# Patient Record
(demographics unavailable — no encounter records)

---

## 2024-10-14 NOTE — ASSESSMENT
[FreeTextEntry1] : 1. Acne- chronic, improved but not at tx goal  - Treatment options previously discussed by other providers including topicals, doxy, accutane. Pt and mom opted for trial of Accutane  -Indication for Isotretinoin: acne vulgaris -Major side effects and risks reviewed in detail. Chief among these are teratogenesis, hepatic injury, dyslipidemia, and severe drying of the mucous membranes. Episodes of mood disturbances have been previously reported, including suicidal ideation and attempts in rare cases. The patient will report any such changes in mood, depressive symptoms or suicidal thoughts, headaches, joint or bone pains.  - Weight: 68 kg - Cumulative dose: 9,000 mg - Goal dose: at least 8,160-10,200 (120-150 mg/kg), though may require higher dosage - Patient notes multiple missed doses. Has ~ 25 day supply of isotretinoin at home - Advised pt to finish 6th sleeve of isotretinoin + 2 additional weeks of 30 mg BID, then STOP   2. High risk medication use - Hepatic profile and TGs on 08/14 reviewed, stable   - Reviewed CBC, CMP 9/2024, WNL   RTC 1 month

## 2024-10-14 NOTE — PHYSICAL EXAM
[FreeTextEntry3] : face clear, back with + scarring, few comedones on the back  mild erythema on central face  xerosis of the lips

## 2024-10-14 NOTE — HISTORY OF PRESENT ILLNESS
[FreeTextEntry1] : f/u acne [de-identified] : 17-year-old M presenting for f/u acne:   Tolerating isotretinoin well. Experiencing lip and eye dryness. Missed several doses this past month as pt was experiencing severe nausea/vomiting 2/2 GERD. Has about 6 sleeves of medication at home left, currently taking pills from 6th sleeve   Denies joint pain, muscle aches, abdominal pain, bloody diarrhea, mood changes, and headaches.   Previous hx: Acne- primarily on face and trunk Present for 4 years Significant dryness on his lips, some painful cracking. Applying Aquaphor and Vaseline multiple times a day.  Denies joint pain, abdominal pain, bloody diarrhea, mood changes, and headaches.  Potentially some signs of depression in the past per mom, which patient denied. Both agree he has not been exhibiting any signs or symptoms of depression recently. Never with any suicidal ideation.  No personal or family history of IBD

## 2024-11-05 NOTE — REASON FOR VISIT
[Subsequent Evaluation] : a subsequent evaluation for [Tinnitus] : tinnitus [Parent] : parent [Other: _____] : [unfilled] [FreeTextEntry2] : dizziness

## 2024-11-05 NOTE — REVIEW OF SYSTEMS
[Hearing Loss] : hearing loss [Dizziness] : dizziness [Vertigo] : vertigo [Ear Noises] : ear noises [Lightheadedness] : lightheadedness [Negative] : Heme/Lymph [Ear Pain] : no ear pain [Ear Itch] : no ear itch [Recurrent Ear Infections] : no recurrent ear infections [Ear Drainage] : no ear drainage [de-identified] : fatigue/ nauseous/ balance issues/ get irritated with loud noises

## 2024-11-05 NOTE — HISTORY OF PRESENT ILLNESS
[de-identified] : c/o problem wtih feeling dizzy for past 2 weeks.  Occ has problems with balance - certain visual and audio things set patient off.  Occ problems with turning head.  Unsteady wtih moving.   Does have headaches.  Occ issues with bright lights.  Does have tinnitus regularly.

## 2024-11-05 NOTE — ASSESSMENT
[FreeTextEntry1] : Patient with intermittent episodes of vertigo - possible bppv - also hx of sensitivity to light and sounds.   Exam unremarkable - audio and tymp normal - feel possible vestbular migraine - will get vng - if normal would recommend neuro eval - follow up after vng.

## 2024-11-15 NOTE — PHYSICAL EXAM
[Well-appearing] : well-appearing [Normocephalic] : normocephalic [No dysmorphic facial features] : no dysmorphic facial features [No ocular abnormalities] : no ocular abnormalities [Neck supple] : neck supple [No abnormal neurocutaneous stigmata or skin lesions] : no abnormal neurocutaneous stigmata or skin lesions [Straight] : straight [No deformities] : no deformities [Alert] : alert [Well related, good eye contact] : well related, good eye contact [Conversant] : conversant [Normal speech and language] : normal speech and language [Follows instructions well] : follows instructions well [VFF] : VFF [Pupils reactive to light and accommodation] : pupils reactive to light and accommodation [Full extraocular movements] : full extraocular movements [No papilledema] : no papilledema [No facial asymmetry or weakness] : no facial asymmetry or weakness [Gross hearing intact] : gross hearing intact [Equal palate elevation] : equal palate elevation [Good shoulder shrug] : good shoulder shrug [Normal tongue movement] : normal tongue movement [Midline tongue, no fasciculations] : midline tongue, no fasciculations [Normal axial and appendicular muscle tone] : normal axial and appendicular muscle tone [Gets up on table without difficulty] : gets up on table without difficulty [Normal finger tapping and fine finger movements] : normal finger tapping and fine finger movements [No abnormal involuntary movements] : no abnormal involuntary movements [5/5 strength in proximal and distal muscles of arms and legs] : 5/5 strength in proximal and distal muscles of arms and legs [Walks and runs well] : walks and runs well [Able to walk on heels] : able to walk on heels [Able to walk on toes] : able to walk on toes [2+ biceps] : 2+ biceps [Triceps] : triceps [Ankle jerks] : ankle jerks [No ankle clonus] : no ankle clonus [Bilaterally] : bilaterally [Localizes LT and temperature] : localizes LT and temperature [No dysmetria on FTNT] : no dysmetria on FTNT [Good walking balance] : good walking balance [Normal gait] : normal gait [Able to tandem well] : able to tandem well [Negative Romberg] : negative Romberg [de-identified] : Endorses diminished sensation to light touch V1-V3 on left side. Head impulse test abnormal, no nystagmus and no skew deviation.  [de-identified] : 2+ Right patellar reflex, 3+ left patellar reflex

## 2024-11-15 NOTE — REASON FOR VISIT
[Initial Consultation] : an initial consultation for [Headache] : headache [Patient] : patient [Mother] : mother [FreeTextEntry2] : Dizziness

## 2024-11-15 NOTE — PHYSICAL EXAM
[Well-appearing] : well-appearing [Normocephalic] : normocephalic [No dysmorphic facial features] : no dysmorphic facial features [No ocular abnormalities] : no ocular abnormalities [Neck supple] : neck supple [No abnormal neurocutaneous stigmata or skin lesions] : no abnormal neurocutaneous stigmata or skin lesions [Straight] : straight [No deformities] : no deformities [Alert] : alert [Well related, good eye contact] : well related, good eye contact [Conversant] : conversant [Normal speech and language] : normal speech and language [Follows instructions well] : follows instructions well [VFF] : VFF [Pupils reactive to light and accommodation] : pupils reactive to light and accommodation [Full extraocular movements] : full extraocular movements [No papilledema] : no papilledema [No facial asymmetry or weakness] : no facial asymmetry or weakness [Gross hearing intact] : gross hearing intact [Equal palate elevation] : equal palate elevation [Good shoulder shrug] : good shoulder shrug [Normal tongue movement] : normal tongue movement [Midline tongue, no fasciculations] : midline tongue, no fasciculations [Normal axial and appendicular muscle tone] : normal axial and appendicular muscle tone [Gets up on table without difficulty] : gets up on table without difficulty [Normal finger tapping and fine finger movements] : normal finger tapping and fine finger movements [No abnormal involuntary movements] : no abnormal involuntary movements [5/5 strength in proximal and distal muscles of arms and legs] : 5/5 strength in proximal and distal muscles of arms and legs [Walks and runs well] : walks and runs well [Able to walk on heels] : able to walk on heels [Able to walk on toes] : able to walk on toes [2+ biceps] : 2+ biceps [Triceps] : triceps [Ankle jerks] : ankle jerks [No ankle clonus] : no ankle clonus [Bilaterally] : bilaterally [Localizes LT and temperature] : localizes LT and temperature [No dysmetria on FTNT] : no dysmetria on FTNT [Good walking balance] : good walking balance [Normal gait] : normal gait [Able to tandem well] : able to tandem well [Negative Romberg] : negative Romberg [de-identified] : Endorses diminished sensation to light touch V1-V3 on left side. Head impulse test abnormal, no nystagmus and no skew deviation.  [de-identified] : 2+ Right patellar reflex, 3+ left patellar reflex

## 2024-11-15 NOTE — CONSULT LETTER
[Dear  ___] : Dear  [unfilled], [Consult Letter:] : I had the pleasure of evaluating your patient, [unfilled]. [Please see my note below.] : Please see my note below. [Consult Closing:] : Thank you very much for allowing me to participate in the care of this patient.  If you have any questions, please do not hesitate to contact me. [Sincerely,] : Sincerely, [FreeTextEntry3] : Dr. Uche Grant Caro, D.O. PGY-4 Child Neurology Resident  Dr. Prem Arredondo M.D. Child Neurology Attending

## 2024-11-15 NOTE — PLAN
[FreeTextEntry1] : 1. Vestibular migraine/peripheral vertigo, r/o IIH - Naproxen 500mg once daily no more than 3 times a week - Meclizine 12.5mg up to 3 times a day as needed - MRI head w/ and w/o contrast - Consider vestibular therapy - F/u in 1-2 months

## 2024-11-15 NOTE — END OF VISIT
[] : Resident [Time Spent: ___ minutes] : I have spent [unfilled] minutes of time on the encounter which excludes teaching and separately reported services. [FreeTextEntry2] : Time billed for excludes time spent on teaching.

## 2024-11-15 NOTE — ASSESSMENT
[FreeTextEntry1] : Tal is a 17 year old adolescent boy presenting for dizziness and headaches. Patient 's history and exam suggestive of either vestibular migraines, or a peripheral vertigo, however given recent prolonged use of Acutane idiopathic intracranial hypertension should be on the differential as well. Patient has previously had a normal MRI and exam today is less indicative of a central etiology of vertigo, but will repeat MRI given new complaints and that these episodes are significant enough to delay patient's return to school. Will recommend symptomatic treatment with Naproxen and Meclizine in an attempt to address both of these possible etiologies (peripheral vertigo and/or vestibular migraines). Discussed with mother that Tal may benefit from vestibular therapy if these episodes continue.

## 2024-11-15 NOTE — HISTORY OF PRESENT ILLNESS
[Previous Imaging] : yes [Blurry Vision] : blurry vision [Tinnitus] : tinnitus [Phonophobia] : phonophobia [Nausea] : nausea [Photophobia] : photophobia [Dizziness] : dizziness [Vomiting] : Vomiting [FreeTextEntry1] : Tal is a 17 year old adolescent boy with PMH of GERD presenting due to headache and dizziness.   Tal reports that starting about 2-3 weeks ago he began to have almost daily episodes of dizziness that he describes as a feeling of the room spinning around him, or him feeling very unbalanced and unable to walk straight. He states that this dizziness is usually triggered by loud sudden noises or sudden bright lights. Tal is normally able to play video games without difficulty but he reports lately it has been uncomfortable to due so. Tal also reports that the dizziness is oftentimes worse at night when laying flat in bed. Mother reports that she has often found that Tal will be unsteady especially after standing up from a seated position and at times while walking he will have to keep himself steady by holding onto the wall or something else for support.   Tal also states that he has had episodes of headaches that at times occur with the dizziness, These headaches may last several hours, at times Tal has gone to bed with them and woken up the next morning with them. He describes them as bitemporal with a pounding/throbbing sensation. Tal has tried Tylenol for these headaches with minimal relief. He also has slight blurry vision, intermittent hearing loss, tinnitus, nausea and vomiting with these headaches. At times, Tal reports his attention/concentration is affected by these headaches. Mother reports that Tal has not been to school for two months due to an incident of bullying--he has been receiving at home virtual instruction since then.   Patient recently seen by ENT, at that visit had a normal hearing test. ENT recommended Videonystagmography for further evaluation for BPPV. Tal has also been followed by GI for GERD and was recently started on Omeprazole and Cyproheptadine, however mother stopped these medications after headaches and dizziness started out of concern that these episodes were medication induced. Tal also follows with dermatology for acne and had been on Acutane for 8 months until recently.   Patient reports adequate amount of sleep, at least 8 hours a night. Appropriate hydration and PO intake endorsed as well. Had a GI illness two months ago but no other recent illnesses.  Patient was previously seen by Dr. Arredondo in June 2021 for a first time seizure like event--he had a MRI with and without contrast at that time which was normal and a normal ambulatory EEG.   PMH: GERD FH: uncle with migraines, no seizure history    [Head Trauma] : no head trauma [Infections] : no infections [Stressors] : no stressors [Paraesthesias] : no paraesthesias

## 2024-11-15 NOTE — REVIEW OF SYSTEMS
[Difficulty with Vision] : difficulty with vision [Normal] : Hematologic/Lymphatic [FreeTextEntry4] : see HPI. [FreeTextEntry8] : see HPI.

## 2024-11-20 NOTE — HISTORY OF PRESENT ILLNESS
[FreeTextEntry1] : RPV: f/u acne [de-identified] : Mr. SULMA SEGURA is a 17 year old M who presents for:   1. f/u Acne Vulgaris:  11/20/2024: Patient completed all remaining pills of Accutane as of several weeks ago. Endorses new onset Vertigo which began at end of October, preceeded by GI illness. Currently persistent, seeing Neuro, GI with pending MRI per Neuro. Continues to experience lip dryness, improving. Otherwise Denies joint pain, muscle aches, abdominal pain, bloody diarrhea, mood changes, and headaches.   Previous hx: Acne- primarily on face and trunk Present for 4 years Significant dryness on his lips, some painful cracking. Applying Aquaphor and Vaseline multiple times a day.  Denies joint pain, abdominal pain, bloody diarrhea, mood changes, and headaches.  Potentially some signs of depression in the past per mom, which patient denied. Both agree he has not been exhibiting any signs or symptoms of depression recently. Never with any suicidal ideation.  No personal or family history of IBD

## 2024-11-20 NOTE — HISTORY OF PRESENT ILLNESS
[FreeTextEntry1] : RPV: f/u acne [de-identified] : Mr. SULMA SEGURA is a 17 year old M who presents for:   1. f/u Acne Vulgaris:  11/20/2024: Patient completed all remaining pills of Accutane as of several weeks ago. Endorses new onset Vertigo which began at end of October, preceeded by GI illness. Currently persistent, seeing Neuro, GI with pending MRI per Neuro. Continues to experience lip dryness, improving. Otherwise Denies joint pain, muscle aches, abdominal pain, bloody diarrhea, mood changes, and headaches.   Previous hx: Acne- primarily on face and trunk Present for 4 years Significant dryness on his lips, some painful cracking. Applying Aquaphor and Vaseline multiple times a day.  Denies joint pain, abdominal pain, bloody diarrhea, mood changes, and headaches.  Potentially some signs of depression in the past per mom, which patient denied. Both agree he has not been exhibiting any signs or symptoms of depression recently. Never with any suicidal ideation.  No personal or family history of IBD

## 2024-11-20 NOTE — PHYSICAL EXAM
[FreeTextEntry3] : face clear, back with + scarring, few comedones on the back  mild erythema on central face  xerosis of the lips, significantly improved

## 2024-11-20 NOTE — ASSESSMENT
[FreeTextEntry1] : 1. Acne- chronic, stable, at treatment goal  with 2. Acne scarring - Patient now s/p Accutane, completed all remaining pills as of several weeks ago (per LV note, had ~ 25 day supply of isotretinoin) - Weight: 68 kg - Cumulative dose to date: 9,000 mg - Goal dose: at least 8,160-10,200 (120-150 mg/kg), though may require higher dosage - Plan to continue monitoring patient's acne while off of Accutane - Discussed option for cosmetic evaluation for further treatment of residual acne scarring when amenable  3. Vertigo - Of note, patient with vertigo first developed at end of October, patient with PMH of GERD and Tinnitus, vertigo first noted after GI infection, currently being worked up by GI, Neuro including pending MRI per Neuro; low likelihood of vertigo associated with Isotretinoin - CONTINUE following up with Neurology for further workup and management   Patient may follow up in 3 months for follow up of acne vulgaris if recurrent, or at later date for cosmetic evaluation, or sooner PRN if any changes, new or growing lesions

## 2024-12-17 NOTE — REVIEW OF SYSTEMS
[Fatigue] : fatigue [Oral Ulcer] : oral ulcer [Negative] : Skin [Immunizations are up to date] : Immunizations are up to date [Rash] : no rash [Eczema] : no eczema [de-identified] : acne, hx tinea versicolor

## 2024-12-17 NOTE — REVIEW OF SYSTEMS
[Fatigue] : fatigue [Oral Ulcer] : oral ulcer [Negative] : Skin [Immunizations are up to date] : Immunizations are up to date [Rash] : no rash [Eczema] : no eczema [de-identified] : acne, hx tinea versicolor

## 2024-12-17 NOTE — ASSESSMENT
[FreeTextEntry1] : 17-year-old male with history of constipation and reflux symptoms here for in reflux symptoms with heartburn and regurgitation as well as irregular stooling.  He had an upper endoscopy showing mild esophagitis however there has been no response to antacid med occasion or Periactin.  He does report the Periactin does improve his stooling pattern though baseline his stools are often loose but can also be hard and clog the toilet.  Symptoms are suggestive of functional disorder as he also has vertigo and dizziness and has not been able to attend school.  They are working on home instruction.  Recommend: -Change to Dexilant every morning -Stool testing -Would continue Periactin at night -Had a long discussion about how he could attend school.  He only needs to take 3 classes to graduate and he reports that the noise and Arun bothers him.  Gave him a note to use the bathroom as needed and suggested using noise canceling headphones or ear loops and asking that he be allowed to leave the classes 5 minutes early so he could change classes without crowds in the hallways at the guidance office would likely be able to help him with these requests -Follow-up with neurology and ENT - Family instructed to call  if any questions or concerns and I would like to see them back in about 2 to 3 months

## 2024-12-17 NOTE — HISTORY OF PRESENT ILLNESS
[de-identified] : This is a 17 year old patient who here for follow-up of abdominal pain, heartburn reflux and irregular stooling.  He had an upper endoscopy in October 2024 which was essentially normal.  There was a small area of the antrum that appeared to be raised suspicious for a pancreatic rest..  Histology showed scant eosinophils in the distal esophagus suggestive of esophagitis as well as lactose intolerance.  He was started on a PPI with no improvement in his symptoms followed by Periactin for gastric motility.  He then developed issues with dizziness and has been following with neurology as well as ENT for vertigo.  I reviewed the recent notes in the EMR from neurology  He is here for follow-up.  He has not been taking the periactin recently.  Did not help with the nausea or reflux but reports it helped him stool. Would have diarrhea and feel constipated. Stool was liquidy but variable sizes.   On the periactin he was able to stool more frequently and better. Currently stooling 1x per day, Stools can be any consistency,  Sometimes big and wide stools that gets stuck, stools are generally more loose than formed. Generally stools 1x per day but can be 3-4x per day, does not wake to stool.  Did have some difficulty providing specifics in his stooling pattern.  There is no blood or mucous in the stool.  Dizziness still the same.  Working with neurology and increasing topiramate, not better. Dizzy even when sitting. vision is fine.  Trouble describing how he feels with the vertigo.  Has trouble walking,  Yesterday he was bringing up foam, felt the reflux come up, foam has been coming up and get stuck in his throat.  No pain when it comes up.  The foam gets stuck and he can't spit it out.  A few times vomited foam but feels it come up, happens most days.  Reflux happens randomly, not related to eating, will randomly burp.  He forgets to take the PPI as it has not been helping.   Still feels burning.  He has gained weight

## 2024-12-17 NOTE — CONSULT LETTER
[Dear  ___] : Dear  [unfilled], [Courtesy Letter:] : I had the pleasure of seeing your patient, [unfilled], in my office today. [Please see my note below.] : Please see my note below. [Consult Closing:] : Thank you very much for allowing me to participate in the care of this patient.  If you have any questions, please do not hesitate to contact me. [Sincerely,] : Sincerely, [FreeTextEntry3] : Telma Marinelli MD\par  Attending Physician\par  Pediatric Gastroenterology and Nutrition

## 2024-12-17 NOTE — PHYSICAL EXAM
[Well Developed] : well developed [Well Nourished] : well nourished [NAD] : in no acute distress [PERRL] : pupils were equal, round, reactive to light  [Moist & Pink Mucous Membranes] : moist and pink mucous membranes [Normal Oropharynx] : the oropharynx was normal [CTAB] : lungs clear to auscultation bilaterally [Regular Rate and Rhythm] : regular rate and rhythm [Normal S1, S2] : normal S1 and S2 [Soft] : soft  [Normal Bowel Sounds] : normal bowel sounds [No HSM] : no hepatosplenomegaly appreciated [Rectal Exam Deferred] : rectal exam was deferred [Normal Tone] : normal tone [Well-Perfused] : well-perfused [Interactive] : interactive [Appropriate Affect] : appropriate affect [Appropriate Behavior] : appropriate behavior [icteric] : anicteric [Oral Ulcers] : no oral ulcers [Respiratory Distress] : no respiratory distress  [Wheeze] : no wheezing  [Murmur] : no murmur [Distended] : non distended [Tender] : tender  [Periumbilical] : in the periumbilical area [Stool Palpable] : no stool palpable [Mass ___ cm] : no masses were palpated [Lymphadenopathy] : no lymphadenopathy  [Edema] : no edema [Cyanosis] : no cyanosis [Rash] : no rash [Jaundice] : no jaundice

## 2024-12-17 NOTE — HISTORY OF PRESENT ILLNESS
[de-identified] : This is a 17 year old patient who here for follow-up of abdominal pain, heartburn reflux and irregular stooling.  He had an upper endoscopy in October 2024 which was essentially normal.  There was a small area of the antrum that appeared to be raised suspicious for a pancreatic rest..  Histology showed scant eosinophils in the distal esophagus suggestive of esophagitis as well as lactose intolerance.  He was started on a PPI with no improvement in his symptoms followed by Periactin for gastric motility.  He then developed issues with dizziness and has been following with neurology as well as ENT for vertigo.  I reviewed the recent notes in the EMR from neurology  He is here for follow-up.  He has not been taking the periactin recently.  Did not help with the nausea or reflux but reports it helped him stool. Would have diarrhea and feel constipated. Stool was liquidy but variable sizes.   On the periactin he was able to stool more frequently and better. Currently stooling 1x per day, Stools can be any consistency,  Sometimes big and wide stools that gets stuck, stools are generally more loose than formed. Generally stools 1x per day but can be 3-4x per day, does not wake to stool.  Did have some difficulty providing specifics in his stooling pattern.  There is no blood or mucous in the stool.  Dizziness still the same.  Working with neurology and increasing topiramate, not better. Dizzy even when sitting. vision is fine.  Trouble describing how he feels with the vertigo.  Has trouble walking,  Yesterday he was bringing up foam, felt the reflux come up, foam has been coming up and get stuck in his throat.  No pain when it comes up.  The foam gets stuck and he can't spit it out.  A few times vomited foam but feels it come up, happens most days.  Reflux happens randomly, not related to eating, will randomly burp.  He forgets to take the PPI as it has not been helping.   Still feels burning.  He has gained weight

## 2025-01-16 NOTE — PROCEDURE
[None] : None [FreeTextEntry1] : hearing loss [FreeTextEntry2] : same [FreeTextEntry3] : Operative microscope was used to examine the ear canal, ear drum and visible middle landmarks.  Adequate exam would not have been possible without the use of a microscope.  Findings are described.

## 2025-01-16 NOTE — REASON FOR VISIT
[Subsequent Evaluation] : a subsequent evaluation for [Mother] : mother [FreeTextEntry2] : vestibular migraines

## 2025-01-16 NOTE — HISTORY OF PRESENT ILLNESS
[de-identified] : 17 year old boy presents for vestibular migraines Last seen here in 2022 for hearing loss Referred by Dr Bustillo for vestibular migraines Follows with neurologist--saw them today, taking topamax-- going to wean off---then will start nortriptyline--recommended to start vestibular therapy Reports since September he has been having intermittent episodes of vertigo, sensitivity to light and sounds, motion sickness. Worse when changing positions and walking.  Continues to have tinnitus--using white noise.  Will sometimes lose hearing in left ear.  Intermittent bilateral otalgia.  VNG negative.  Does not wear hearing aids.  Denies otorrhea, fevers, and ear infections MRI [facility] 11/27/24

## 2025-01-16 NOTE — HISTORY OF PRESENT ILLNESS
[de-identified] : 17 year old boy presents for vestibular migraines Last seen here in 2022 for hearing loss Referred by Dr Bustillo for vestibular migraines Follows with neurologist--saw them today, taking topamax-- going to wean off---then will start nortriptyline--recommended to start vestibular therapy Reports since September he has been having intermittent episodes of vertigo, sensitivity to light and sounds, motion sickness. Worse when changing positions and walking.  Continues to have tinnitus--using white noise.  Will sometimes lose hearing in left ear.  Intermittent bilateral otalgia.  VNG negative.  Does not wear hearing aids.  Denies otorrhea, fevers, and ear infections MRI [facility] 11/27/24

## 2025-01-16 NOTE — CONSULT LETTER
[Dear  ___] : Dear  [unfilled], [Consult Letter:] : I had the pleasure of evaluating your patient, [unfilled]. [Please see my note below.] : Please see my note below. [Consult Closing:] : Thank you very much for allowing me to participate in the care of this patient.  If you have any questions, please do not hesitate to contact me. [Sincerely,] : Sincerely, [FreeTextEntry2] : Angelica Mares MD  [FreeTextEntry3] : Patience Raya MD, Otology Neurotology & Skull Base Surgery

## 2025-01-16 NOTE — PHYSICAL EXAM
[Clear to Auscultation] : lungs were clear to auscultation bilaterally [Normal Gait and Station] : normal gait and station [Normal muscle strength, symmetry and tone of facial, head and neck musculature] : normal muscle strength, symmetry and tone of facial, head and neck musculature [Normal] : no cervical lymphadenopathy [Exposed Vessel] : left anterior vessel not exposed [Wheezing] : no wheezing [Increased Work of Breathing] : no increased work of breathing with use of accessory muscles and retractions [de-identified] : +TTP at TMJs

## 2025-01-16 NOTE — PHYSICAL EXAM
[Clear to Auscultation] : lungs were clear to auscultation bilaterally [Normal Gait and Station] : normal gait and station [Normal muscle strength, symmetry and tone of facial, head and neck musculature] : normal muscle strength, symmetry and tone of facial, head and neck musculature [Normal] : no cervical lymphadenopathy [Exposed Vessel] : left anterior vessel not exposed [Wheezing] : no wheezing [Increased Work of Breathing] : no increased work of breathing with use of accessory muscles and retractions [de-identified] : +TTP at TMJs

## 2025-01-17 NOTE — HISTORY OF PRESENT ILLNESS
[FreeTextEntry1] : 17y M w/ no significant pmh presenting for follow up of suspected vestibular migraine.   Interval history: Patient continues to have daily episodes of dizziness (room spinning, feeling unsteady) sometimes associated w/ headcaches that are worsened w/ sudden changes in movement, bright lights, loud noises. He has tried 50mg BID of Topiramate for 3 weeks w/ no relief, tried rizatripan x1 w/ minimal relief. He has been unable to attend school and is awaiting home instruction. Has had an VNG which was wnl and audiology testing has been normal.

## 2025-01-17 NOTE — ASSESSMENT
[FreeTextEntry1] : 17y M w/ no pmh presenting for follow up evaluation of vestibular migraine. Patient w/ nonfocal exam. His symptoms continue to persist, and have some feature of inner ear dysfunction (vertiginous symptoms worsening w/ movement) as well as some features consistent w/ vestibular migraine (associated w/ headache, photo/phonophobia), given this suspect that etiology of patient's presentation is likely multifactorial. He at this point has no identified structural etiology for his symptoms Given lack of efficacy of topiramate will wean off and switch to Nortriptyline will escalation plan. Should continue to follow up ENT, and RTC in 6-8 weeks.

## 2025-01-17 NOTE — CONSULT LETTER
[FreeTextEntry3] : Radha Vuong MD PGY- 4 Child Neurology [facility]  Lynette Pimentel MD Child Neurology Attending Physician [facility] [address], [address] [place] [phone] [facility]

## 2025-01-17 NOTE — CONSULT LETTER
[FreeTextEntry3] : Radha Vuong MD PGY- 4 Child Neurology [facility]  Lynette Pimentel MD Child Neurology Attending Physician [facility] [address], [address] [place] [id number] [facility]

## 2025-01-17 NOTE — PLAN
[FreeTextEntry1] : - nortriptyline 10mg qD x1wk increase to 20mg qD - wean topiramate to 50mg qD x1wk then off - lifestyle modifications - migravent/migralief qD - vestibular therapy - RTC in 6-8 weeks.

## 2025-01-17 NOTE — QUALITY MEASURES
[Referral to behavioral health for frequent headaches discussed] : Referral to behavioral health for frequent headaches discussed: Not Applicable

## 2025-02-25 NOTE — PLAN
[FreeTextEntry1] : [ ] CBC, CMP, VitD, Ferritin, TFTs today [ ] Neuropsych referral provided [ ] Increase Nortriptyline to 30mg qHS [ ] RTC in 6 weeks or earlier if any concerns

## 2025-02-25 NOTE — PHYSICAL EXAM
[Well-appearing] : well-appearing [Normocephalic] : normocephalic [No dysmorphic facial features] : no dysmorphic facial features [No ocular abnormalities] : no ocular abnormalities [Neck supple] : neck supple [No deformities] : no deformities [Alert] : alert [Well related, good eye contact] : well related, good eye contact [Conversant] : conversant [Normal speech and language] : normal speech and language [Follows instructions well] : follows instructions well [VFF] : VFF [Pupils reactive to light and accommodation] : pupils reactive to light and accommodation [Full extraocular movements] : full extraocular movements [Saccadic and smooth pursuits intact] : saccadic and smooth pursuits intact [No nystagmus] : no nystagmus [No papilledema] : no papilledema [Normal facial sensation to light touch] : normal facial sensation to light touch [No facial asymmetry or weakness] : no facial asymmetry or weakness [Gross hearing intact] : gross hearing intact [Equal palate elevation] : equal palate elevation [Good shoulder shrug] : good shoulder shrug [Normal tongue movement] : normal tongue movement [Midline tongue, no fasciculations] : midline tongue, no fasciculations [R handed] : R handed [Normal axial and appendicular muscle tone] : normal axial and appendicular muscle tone [Gets up on table without difficulty] : gets up on table without difficulty [No pronator drift] : no pronator drift [Normal finger tapping and fine finger movements] : normal finger tapping and fine finger movements [No abnormal involuntary movements] : no abnormal involuntary movements [5/5 strength in proximal and distal muscles of arms and legs] : 5/5 strength in proximal and distal muscles of arms and legs [Walks and runs well] : walks and runs well [Able to do deep knee bend] : able to do deep knee bend [Able to walk on heels] : able to walk on heels [Able to walk on toes] : able to walk on toes [2+ biceps] : 2+ biceps [Triceps] : triceps [Knee jerks] : knee jerks [Ankle jerks] : ankle jerks [No ankle clonus] : no ankle clonus [Localizes LT and temperature] : localizes LT and temperature [No dysmetria on FTNT] : no dysmetria on FTNT [Good walking balance] : good walking balance [Normal gait] : normal gait [Able to tandem well] : able to tandem well [Negative Romberg] : negative Romberg [de-identified] : HINTS negative

## 2025-02-25 NOTE — PHYSICAL EXAM
[Well-appearing] : well-appearing [Normocephalic] : normocephalic [No dysmorphic facial features] : no dysmorphic facial features [No ocular abnormalities] : no ocular abnormalities [Neck supple] : neck supple [No deformities] : no deformities [Alert] : alert [Well related, good eye contact] : well related, good eye contact [Conversant] : conversant [Normal speech and language] : normal speech and language [Follows instructions well] : follows instructions well [VFF] : VFF [Pupils reactive to light and accommodation] : pupils reactive to light and accommodation [Full extraocular movements] : full extraocular movements [Saccadic and smooth pursuits intact] : saccadic and smooth pursuits intact [No nystagmus] : no nystagmus [No papilledema] : no papilledema [Normal facial sensation to light touch] : normal facial sensation to light touch [No facial asymmetry or weakness] : no facial asymmetry or weakness [Gross hearing intact] : gross hearing intact [Equal palate elevation] : equal palate elevation [Good shoulder shrug] : good shoulder shrug [Normal tongue movement] : normal tongue movement [Midline tongue, no fasciculations] : midline tongue, no fasciculations [R handed] : R handed [Normal axial and appendicular muscle tone] : normal axial and appendicular muscle tone [Gets up on table without difficulty] : gets up on table without difficulty [No pronator drift] : no pronator drift [Normal finger tapping and fine finger movements] : normal finger tapping and fine finger movements [No abnormal involuntary movements] : no abnormal involuntary movements [5/5 strength in proximal and distal muscles of arms and legs] : 5/5 strength in proximal and distal muscles of arms and legs [Walks and runs well] : walks and runs well [Able to do deep knee bend] : able to do deep knee bend [Able to walk on heels] : able to walk on heels [Able to walk on toes] : able to walk on toes [2+ biceps] : 2+ biceps [Triceps] : triceps [Knee jerks] : knee jerks [Ankle jerks] : ankle jerks [No ankle clonus] : no ankle clonus [Localizes LT and temperature] : localizes LT and temperature [No dysmetria on FTNT] : no dysmetria on FTNT [Good walking balance] : good walking balance [Normal gait] : normal gait [Able to tandem well] : able to tandem well [Negative Romberg] : negative Romberg [de-identified] : HINTS negative

## 2025-02-25 NOTE — ASSESSMENT
[FreeTextEntry1] : 18yo M w/ no PMH presenting for follow up evaluation of vestibular migraine. Neurologic exam today is intact and reassuring. No benefit noticed on Nortriptyline 20mg qD, however can take up to 6 weeks to see true effect and Tal has not been completely compliant. Will increase to 30mg qD and assess, if no improvement, will switch to Effexor. Regarding the new math difficulties, unclear if true cognitive deficits vs related to nausea and difficulty concentrating 2/2 vertigo, will refer for neuropsych testing.

## 2025-02-25 NOTE — HISTORY OF PRESENT ILLNESS
[FreeTextEntry1] : Tal is a 16yo M w/ no significant PMH presenting for follow up of suspected vestibular migraine. Last seen Jan 16, 2025.  Interval history: Weaned off of topiramate and uptitrated Nortriptyline to 20mg qD. Saw ENT Jan 16 who recommended biofeedback and vestibular rehab, also raised concern of cognitive decline in focus and attention at school. Mom has noticed some twitching of face/head worsened in setting of altered sleep schedule; melatonin supplement started. Today, pt and MOC report no improvement in headache frequency or severity nor improvement in vertigo since starting Nortryptiline, however Tal admits he hasn't taken it consistently this past week. He has tried the rizatriptan twice since it was prescribed and says it doesn't help much.  Of note, MOC and pt also report that Tal has had new difficulty performing in math. Typically does well in Calculus but has recently been struggling to help his 4th grade sister with her math homework.   Current medications: Nortriptyline 20mg qD Migrelief Rizatriptan PRN  History reviewed: At last visit, was continuing to complain of daily episodes of dizziness (room spinning, feeling unsteady) sometimes associated w/ headaches that are worsened w/ sudden changes in movement, bright lights, loud noises. At last visit, he was unable to attend school and is awaiting home instruction. Has had an VNG which was wnl and audiology testing has been normal.  Previous medications:  Topiramate 50mg BID (no relief) for 3 weeks w/ no relief  MRI brain w/wo (11/2024): normal

## 2025-02-25 NOTE — ASSESSMENT
[FreeTextEntry1] : 16yo M w/ no PMH presenting for follow up evaluation of vestibular migraine. Neurologic exam today is intact and reassuring. No benefit noticed on Nortriptyline 20mg qD, however can take up to 6 weeks to see true effect and Tal has not been completely compliant. Will increase to 30mg qD and assess, if no improvement, will switch to Effexor. Regarding the new math difficulties, unclear if true cognitive deficits vs related to nausea and difficulty concentrating 2/2 vertigo, will refer for neuropsych testing.

## 2025-02-25 NOTE — END OF VISIT
[] : Resident [Time Spent: ___ minutes] : I have spent [unfilled] minutes of time on the encounter which excludes teaching and separately reported services. [FreeTextEntry3] : Time spent excludes teaching Quality 130: Documentation Of Current Medications In The Medical Record: Current Medications with Name, Dosage, Frequency, or Route not Documented, Reason not Given Quality 111:Pneumonia Vaccination Status For Older Adults: Pneumococcal Vaccination Previously Received Quality 110: Preventive Care And Screening: Influenza Immunization: Influenza Immunization Administered during Influenza season Quality 431: Preventive Care And Screening: Unhealthy Alcohol Use - Screening: Patient screened for unhealthy alcohol use using a single question and scores less than 2 times per year Detail Level: Detailed Quality 226: Preventive Care And Screening: Tobacco Use: Screening And Cessation Intervention: Patient screened for tobacco use and is an ex/non-smoker

## 2025-03-06 NOTE — HISTORY OF PRESENT ILLNESS
[de-identified] : 17 year old boy presents for follow up evaluation of vestibular migraines. History of negative VNG. Follows with neurologist-- off the Topamax, now nortriptyline TID. Reports general sense of dizziness unchanged since last visit. Dizziness fluctuates. Triggered by light / loud sounds. Tinnitus is stable. Using a fan at night for white noise.  MRI Head 11/27/24

## 2025-03-06 NOTE — PHYSICAL EXAM
[Clear to Auscultation] : lungs were clear to auscultation bilaterally [Normal Gait and Station] : normal gait and station [Normal muscle strength, symmetry and tone of facial, head and neck musculature] : normal muscle strength, symmetry and tone of facial, head and neck musculature [Normal] : no cervical lymphadenopathy [Exposed Vessel] : left anterior vessel not exposed [Wheezing] : no wheezing [Increased Work of Breathing] : no increased work of breathing with use of accessory muscles and retractions [de-identified] : +TTP at TMJs

## 2025-03-11 NOTE — HISTORY OF PRESENT ILLNESS
[de-identified] : This is a 17 year old patient who here for follow-up of abdominal pain, heartburn reflux and irregular stooling.  He had an upper endoscopy in October 2024 which was essentially normal.  There was a small area of the antrum that appeared to be raised suspicious for a pancreatic rest..  Histology showed scant eosinophils in the distal esophagus suggestive of esophagitis as well as lactose intolerance.  He was started on a PPI with no improvement in his symptoms followed by Periactin for gastric motility.  He then developed issues with dizziness and has been following with neurology as well as ENT for vertigo.  I reviewed the recent notes in the EMR from neurology and ENT.   He is here for follow-up.  His vomiting improved.  No longer having spitting up.  He continues to have issues with dizziness.  He has been doing vestibular therapy.   Sometimes the food comes up, but not out. Still has some foam but not throwing up as much.  burping some.  His appetite has been less.  Eating less.  Has been doing some intermittent fasting because a website told him he was fat. He has been eating more healthy.   He is on dexilant, not sure if it helps, over time the nausea and vomiting has decreased, has leveled off.  He has not taken the Dexilant this week and does not feel any different.  Currently taking nortriptyline at night, he is not taking cyprohepatinge. OTC mag at night. Stools are 2x/d. Pierz 2/3 or 4. Recently hard to pass. No rectal pain with stooling. Added lena seeds. Going on more walks.  He is sometimes eating large amounts with intermittent fasting but says he is mostly not as hungry.  He has gained weight since last visit.

## 2025-03-11 NOTE — REVIEW OF SYSTEMS
[Fatigue] : fatigue [Oral Ulcer] : oral ulcer [Negative] : Skin [Immunizations are up to date] : Immunizations are up to date [Rash] : no rash [Eczema] : no eczema [de-identified] : acne, hx tinea versicolor

## 2025-03-11 NOTE — REASON FOR VISIT
[Consultation Follow Up] : a consultation follow up  [Patient] : patient [Mother] : mother [Father] : father

## 2025-03-11 NOTE — PHYSICAL EXAM
[Well Developed] : well developed [Well Nourished] : well nourished [NAD] : in no acute distress [PERRL] : pupils were equal, round, reactive to light  [Moist & Pink Mucous Membranes] : moist and pink mucous membranes [Normal Oropharynx] : the oropharynx was normal [CTAB] : lungs clear to auscultation bilaterally [Regular Rate and Rhythm] : regular rate and rhythm [Normal S1, S2] : normal S1 and S2 [Soft] : soft  [Periumbilical] : in the periumbilical area [Normal Bowel Sounds] : normal bowel sounds [No HSM] : no hepatosplenomegaly appreciated [Rectal Exam Deferred] : rectal exam was deferred [Normal Tone] : normal tone [Well-Perfused] : well-perfused [Interactive] : interactive [Appropriate Affect] : appropriate affect [Appropriate Behavior] : appropriate behavior [Alert and Active] : alert and active [icteric] : anicteric [Oral Ulcers] : no oral ulcers [Respiratory Distress] : no respiratory distress  [Wheeze] : no wheezing  [Murmur] : no murmur [Distended] : non distended [Tender] : non tender [Stool Palpable] : no stool palpable [Mass ___ cm] : no masses were palpated [Lymphadenopathy] : no lymphadenopathy  [Edema] : no edema [Cyanosis] : no cyanosis [Rash] : no rash [Jaundice] : no jaundice

## 2025-03-11 NOTE — REVIEW OF SYSTEMS
[Fatigue] : fatigue [Oral Ulcer] : oral ulcer [Negative] : Skin [Immunizations are up to date] : Immunizations are up to date [Rash] : no rash [Eczema] : no eczema [de-identified] : acne, hx tinea versicolor

## 2025-03-11 NOTE — HISTORY OF PRESENT ILLNESS
[de-identified] : This is a 17 year old patient who here for follow-up of abdominal pain, heartburn reflux and irregular stooling.  He had an upper endoscopy in October 2024 which was essentially normal.  There was a small area of the antrum that appeared to be raised suspicious for a pancreatic rest..  Histology showed scant eosinophils in the distal esophagus suggestive of esophagitis as well as lactose intolerance.  He was started on a PPI with no improvement in his symptoms followed by Periactin for gastric motility.  He then developed issues with dizziness and has been following with neurology as well as ENT for vertigo.  I reviewed the recent notes in the EMR from neurology and ENT.   He is here for follow-up.  His vomiting improved.  No longer having spitting up.  He continues to have issues with dizziness.  He has been doing vestibular therapy.   Sometimes the food comes up, but not out. Still has some foam but not throwing up as much.  burping some.  His appetite has been less.  Eating less.  Has been doing some intermittent fasting because a website told him he was fat. He has been eating more healthy.   He is on dexilant, not sure if it helps, over time the nausea and vomiting has decreased, has leveled off.  He has not taken the Dexilant this week and does not feel any different.  Currently taking nortriptyline at night, he is not taking cyprohepatinge. OTC mag at night. Stools are 2x/d. Hope 2/3 or 4. Recently hard to pass. No rectal pain with stooling. Added lena seeds. Going on more walks.  He is sometimes eating large amounts with intermittent fasting but says he is mostly not as hungry.  He has gained weight since last visit.

## 2025-03-11 NOTE — ASSESSMENT
[FreeTextEntry1] : 17-year-old male with history of constipation and reflux symptoms here for follow-up of reflux symptoms with heartburn and regurgitation as well as irregular stooling.  He had an upper endoscopy showing mild esophagitis however there has been no response to antacid medication or Periactin in the past.  Clinically the vomiting has stopped which is encouraging though he does report intermittent reflux symptoms.  He has been practicing intermittent fasting which is likely not helping his reflux symptoms.  We had a long discussion about small frequent meals and dietary changes for reflux.  He continues to have issues with vertigo and dizziness and is following closely with ENT and neurology.  Recommend: -Can discontinue Dexilant -Increase magnesium with goal of soft daily stools -Would continue Periactin at night -Follow-up with neurology and ENT - Family instructed to call  if any questions or concerns and I would like to see them back in about 4 to 6 months

## 2025-03-30 NOTE — HISTORY OF PRESENT ILLNESS
[FreeTextEntry1] : 17-year-old male with a 2 to 3-year history of vertigo and headaches.  He was given nortriptyline and topiramate but it appears that the nortriptyline and topiramate were discontinued and it is not clear if he treatment was helpful.  His symptoms began last year with intermittent episodes of vertigo sensitivity to light and sounds and motion sickness.  His symptoms were worse with changing position and walking.  He continued to have complaint of tinnitus and white noise.  He did suffer seizures in childhood and did not receive treatment because of a single seizure.

## 2025-03-30 NOTE — DISCUSSION/SUMMARY
[FreeTextEntry1] : Likely vestibular migraine likely failed topiramate and nortriptyline.  Recommend Depakote followed by blood test to ensure he does not have's liver abnormalities or CBC abnormalities due to Depakote after 1 month of use.  He will communicate with me after he does his blood tests 1 month from now and I will advise him whether to continue the medication or discontinue it because of side effects.  Promethazine suppository is advised as needed occasionally for severe vertigo nausea and vomiting

## 2025-03-30 NOTE — PHYSICAL EXAM
[Person] : oriented to person [Place] : oriented to place [Time] : oriented to time [Concentration Intact] : normal concentrating ability [Visual Intact] : visual attention was ~T not ~L decreased [Naming Objects] : no difficulty naming common objects [Repeating Phrases] : no difficulty repeating a phrase [Writing A Sentence] : no difficulty writing a sentence [Fluency] : fluency intact [Comprehension] : comprehension intact [Reading] : reading intact [Past History] : adequate knowledge of personal past history [Cranial Nerves Optic (II)] : visual acuity intact bilaterally,  visual fields full to confrontation, pupils equal round and reactive to light [Cranial Nerves Oculomotor (III)] : extraocular motion intact [Cranial Nerves Trigeminal (V)] : facial sensation intact symmetrically [Cranial Nerves Facial (VII)] : face symmetrical [Cranial Nerves Vestibulocochlear (VIII)] : hearing was intact bilaterally [Cranial Nerves Glossopharyngeal (IX)] : tongue and palate midline [Cranial Nerves Accessory (XI - Cranial And Spinal)] : head turning and shoulder shrug symmetric [Cranial Nerves Hypoglossal (XII)] : there was no tongue deviation with protrusion [Motor Tone] : muscle tone was normal in all four extremities [Motor Strength] : muscle strength was normal in all four extremities [No Muscle Atrophy] : normal bulk in all four extremities [Sensation Tactile Decrease] : light touch was intact [Abnormal Walk] : normal gait [Balance] : balance was intact [Past-pointing] : there was no past-pointing [Tremor] : no tremor present [2+] : Ankle jerk left 2+ [Plantar Reflex Right Only] : normal on the right [Plantar Reflex Left Only] : normal on the left [FreeTextEntry5] : Gaye-Hallpike testing negative [Sclera] : the sclera and conjunctiva were normal [PERRL With Normal Accommodation] : pupils were equal in size, round, reactive to light, with normal accommodation [Extraocular Movements] : extraocular movements were intact [Outer Ear] : the ears and nose were normal in appearance [Oropharynx] : the oropharynx was normal [Neck Appearance] : the appearance of the neck was normal [Neck Cervical Mass (___cm)] : no neck mass was observed [Jugular Venous Distention Increased] : there was no jugular-venous distention [Thyroid Diffuse Enlargement] : the thyroid was not enlarged [Thyroid Nodule] : there were no palpable thyroid nodules [Auscultation Breath Sounds / Voice Sounds] : lungs were clear to auscultation bilaterally [Heart Rate And Rhythm] : heart rate was normal and rhythm regular [Heart Sounds] : normal S1 and S2 [Heart Sounds Gallop] : no gallops [Murmurs] : no murmurs [Heart Sounds Pericardial Friction Rub] : no pericardial rub [Full Pulse] : the pedal pulses are present [Edema] : there was no peripheral edema [Bowel Sounds] : normal bowel sounds [Abdomen Soft] : soft [Abdomen Tenderness] : non-tender [Abdomen Mass (___ Cm)] : no abdominal mass palpated [No CVA Tenderness] : no ~M costovertebral angle tenderness [No Spinal Tenderness] : no spinal tenderness [Skin Color & Pigmentation] : normal skin color and pigmentation [Skin Turgor] : normal skin turgor [] : no rash [FreeTextEntry1] : There are no skin lesions birthmarks cafe [place] lait spots shagreen patch or axillary freckles and facial tubers.